# Patient Record
Sex: MALE | Race: BLACK OR AFRICAN AMERICAN | NOT HISPANIC OR LATINO | Employment: FULL TIME | URBAN - METROPOLITAN AREA
[De-identification: names, ages, dates, MRNs, and addresses within clinical notes are randomized per-mention and may not be internally consistent; named-entity substitution may affect disease eponyms.]

---

## 2021-12-06 ENCOUNTER — TELEPHONE (OUTPATIENT)
Dept: FAMILY MEDICINE CLINIC | Facility: CLINIC | Age: 26
End: 2021-12-06

## 2021-12-08 ENCOUNTER — OFFICE VISIT (OUTPATIENT)
Dept: FAMILY MEDICINE CLINIC | Facility: CLINIC | Age: 26
End: 2021-12-08

## 2021-12-08 ENCOUNTER — APPOINTMENT (OUTPATIENT)
Dept: LAB | Facility: CLINIC | Age: 26
End: 2021-12-08

## 2021-12-08 VITALS
SYSTOLIC BLOOD PRESSURE: 138 MMHG | BODY MASS INDEX: 30.95 KG/M2 | HEART RATE: 81 BPM | WEIGHT: 192.6 LBS | TEMPERATURE: 98.7 F | DIASTOLIC BLOOD PRESSURE: 74 MMHG | HEIGHT: 66 IN | OXYGEN SATURATION: 100 %

## 2021-12-08 DIAGNOSIS — Z11.4 SCREENING FOR HIV (HUMAN IMMUNODEFICIENCY VIRUS): ICD-10-CM

## 2021-12-08 DIAGNOSIS — Z11.59 ENCOUNTER FOR HEPATITIS C SCREENING TEST FOR LOW RISK PATIENT: ICD-10-CM

## 2021-12-08 DIAGNOSIS — Z02.89 ENCOUNTER FOR PHYSICAL EXAMINATION RELATED TO EMPLOYMENT: Primary | ICD-10-CM

## 2021-12-08 DIAGNOSIS — Z02.89 ENCOUNTER FOR PHYSICAL EXAMINATION RELATED TO EMPLOYMENT: ICD-10-CM

## 2021-12-08 DIAGNOSIS — R03.0 ELEVATED BLOOD PRESSURE READING IN OFFICE WITH WHITE COAT SYNDROME, WITHOUT DIAGNOSIS OF HYPERTENSION: ICD-10-CM

## 2021-12-08 LAB
ALBUMIN SERPL BCP-MCNC: 4.3 G/DL (ref 3.5–5)
ALP SERPL-CCNC: 147 U/L (ref 46–116)
ALT SERPL W P-5'-P-CCNC: 100 U/L (ref 12–78)
ANION GAP SERPL CALCULATED.3IONS-SCNC: 4 MMOL/L (ref 4–13)
AST SERPL W P-5'-P-CCNC: 35 U/L (ref 5–45)
BASOPHILS # BLD AUTO: 0.04 THOUSANDS/ΜL (ref 0–0.1)
BASOPHILS NFR BLD AUTO: 1 % (ref 0–1)
BILIRUB SERPL-MCNC: 0.99 MG/DL (ref 0.2–1)
BUN SERPL-MCNC: 14 MG/DL (ref 5–25)
CALCIUM SERPL-MCNC: 9 MG/DL (ref 8.3–10.1)
CHLORIDE SERPL-SCNC: 106 MMOL/L (ref 100–108)
CO2 SERPL-SCNC: 28 MMOL/L (ref 21–32)
CREAT SERPL-MCNC: 0.96 MG/DL (ref 0.6–1.3)
EOSINOPHIL # BLD AUTO: 0.09 THOUSAND/ΜL (ref 0–0.61)
EOSINOPHIL NFR BLD AUTO: 2 % (ref 0–6)
ERYTHROCYTE [DISTWIDTH] IN BLOOD BY AUTOMATED COUNT: 13 % (ref 11.6–15.1)
GFR SERPL CREATININE-BSD FRML MDRD: 127 ML/MIN/1.73SQ M
GLUCOSE P FAST SERPL-MCNC: 73 MG/DL (ref 65–99)
HCT VFR BLD AUTO: 45.1 % (ref 36.5–49.3)
HCV AB SER QL: NORMAL
HGB BLD-MCNC: 15.2 G/DL (ref 12–17)
IMM GRANULOCYTES # BLD AUTO: 0.01 THOUSAND/UL (ref 0–0.2)
IMM GRANULOCYTES NFR BLD AUTO: 0 % (ref 0–2)
LYMPHOCYTES # BLD AUTO: 1.9 THOUSANDS/ΜL (ref 0.6–4.47)
LYMPHOCYTES NFR BLD AUTO: 34 % (ref 14–44)
MCH RBC QN AUTO: 29.7 PG (ref 26.8–34.3)
MCHC RBC AUTO-ENTMCNC: 33.7 G/DL (ref 31.4–37.4)
MCV RBC AUTO: 88 FL (ref 82–98)
MONOCYTES # BLD AUTO: 0.36 THOUSAND/ΜL (ref 0.17–1.22)
MONOCYTES NFR BLD AUTO: 7 % (ref 4–12)
NEUTROPHILS # BLD AUTO: 3.14 THOUSANDS/ΜL (ref 1.85–7.62)
NEUTS SEG NFR BLD AUTO: 56 % (ref 43–75)
NRBC BLD AUTO-RTO: 0 /100 WBCS
PLATELET # BLD AUTO: 332 THOUSANDS/UL (ref 149–390)
PMV BLD AUTO: 10.6 FL (ref 8.9–12.7)
POTASSIUM SERPL-SCNC: 4.1 MMOL/L (ref 3.5–5.3)
PROT SERPL-MCNC: 7.6 G/DL (ref 6.4–8.2)
RBC # BLD AUTO: 5.11 MILLION/UL (ref 3.88–5.62)
SODIUM SERPL-SCNC: 138 MMOL/L (ref 136–145)
TSH SERPL DL<=0.05 MIU/L-ACNC: 1.55 UIU/ML (ref 0.36–3.74)
WBC # BLD AUTO: 5.54 THOUSAND/UL (ref 4.31–10.16)

## 2021-12-08 PROCEDURE — 84443 ASSAY THYROID STIM HORMONE: CPT

## 2021-12-08 PROCEDURE — 80053 COMPREHEN METABOLIC PANEL: CPT

## 2021-12-08 PROCEDURE — 86803 HEPATITIS C AB TEST: CPT

## 2021-12-08 PROCEDURE — 99385 PREV VISIT NEW AGE 18-39: CPT | Performed by: FAMILY MEDICINE

## 2021-12-08 PROCEDURE — 85025 COMPLETE CBC W/AUTO DIFF WBC: CPT

## 2021-12-08 PROCEDURE — 36415 COLL VENOUS BLD VENIPUNCTURE: CPT

## 2021-12-08 PROCEDURE — 87389 HIV-1 AG W/HIV-1&-2 AB AG IA: CPT

## 2021-12-08 RX ORDER — HYDROCHLOROTHIAZIDE 12.5 MG/1
TABLET ORAL
COMMUNITY
Start: 2021-11-30 | End: 2022-04-13

## 2021-12-08 RX ORDER — HYDROCHLOROTHIAZIDE 12.5 MG/1
12.5 TABLET ORAL DAILY
Qty: 90 TABLET | Refills: 1 | Status: CANCELLED | OUTPATIENT
Start: 2021-12-08

## 2021-12-10 LAB — HIV 1+2 AB+HIV1 P24 AG SERPL QL IA: NORMAL

## 2021-12-15 DIAGNOSIS — R74.01 TRANSAMINITIS: Primary | ICD-10-CM

## 2022-04-13 ENCOUNTER — APPOINTMENT (OUTPATIENT)
Dept: LAB | Facility: CLINIC | Age: 27
End: 2022-04-13

## 2022-04-13 ENCOUNTER — OFFICE VISIT (OUTPATIENT)
Dept: FAMILY MEDICINE CLINIC | Facility: CLINIC | Age: 27
End: 2022-04-13

## 2022-04-13 VITALS
OXYGEN SATURATION: 99 % | WEIGHT: 184 LBS | TEMPERATURE: 98.7 F | BODY MASS INDEX: 29.57 KG/M2 | DIASTOLIC BLOOD PRESSURE: 100 MMHG | SYSTOLIC BLOOD PRESSURE: 156 MMHG | HEART RATE: 74 BPM | HEIGHT: 66 IN

## 2022-04-13 DIAGNOSIS — R74.01 TRANSAMINITIS: ICD-10-CM

## 2022-04-13 DIAGNOSIS — I10 HYPERTENSION, UNSPECIFIED TYPE: Primary | ICD-10-CM

## 2022-04-13 PROBLEM — R03.0 ELEVATED BLOOD PRESSURE READING IN OFFICE WITH WHITE COAT SYNDROME, WITHOUT DIAGNOSIS OF HYPERTENSION: Status: RESOLVED | Noted: 2021-12-08 | Resolved: 2022-04-13

## 2022-04-13 LAB
ALBUMIN SERPL BCP-MCNC: 4.3 G/DL (ref 3.5–5)
ALP SERPL-CCNC: 120 U/L (ref 46–116)
ALT SERPL W P-5'-P-CCNC: 37 U/L (ref 12–78)
ANION GAP SERPL CALCULATED.3IONS-SCNC: 3 MMOL/L (ref 4–13)
AST SERPL W P-5'-P-CCNC: 22 U/L (ref 5–45)
BILIRUB SERPL-MCNC: 0.34 MG/DL (ref 0.2–1)
BUN SERPL-MCNC: 14 MG/DL (ref 5–25)
CALCIUM SERPL-MCNC: 9.2 MG/DL (ref 8.3–10.1)
CHLORIDE SERPL-SCNC: 109 MMOL/L (ref 100–108)
CO2 SERPL-SCNC: 27 MMOL/L (ref 21–32)
CREAT SERPL-MCNC: 0.8 MG/DL (ref 0.6–1.3)
GFR SERPL CREATININE-BSD FRML MDRD: 125 ML/MIN/1.73SQ M
GLUCOSE P FAST SERPL-MCNC: 93 MG/DL (ref 65–99)
POTASSIUM SERPL-SCNC: 3.8 MMOL/L (ref 3.5–5.3)
PROT SERPL-MCNC: 7.4 G/DL (ref 6.4–8.2)
SODIUM SERPL-SCNC: 139 MMOL/L (ref 136–145)

## 2022-04-13 PROCEDURE — 80053 COMPREHEN METABOLIC PANEL: CPT

## 2022-04-13 PROCEDURE — 36415 COLL VENOUS BLD VENIPUNCTURE: CPT

## 2022-04-13 PROCEDURE — 99214 OFFICE O/P EST MOD 30 MIN: CPT | Performed by: FAMILY MEDICINE

## 2022-04-13 RX ORDER — AMLODIPINE BESYLATE 5 MG/1
5 TABLET ORAL DAILY
Qty: 90 TABLET | Refills: 1 | Status: SHIPPED | OUTPATIENT
Start: 2022-04-13

## 2022-04-13 NOTE — ASSESSMENT & PLAN NOTE
Patient with history of HTN  He is currently not taking medication  We discussed the importance of blood pressure control and risks of elevated HTN including stroke and MI  He is agreeable to starting medication  Stop HCTZ as it has not been effective  Start amlodipine 5 mg daily  Patient was provided with information for [employer] to obtain cheaper medication  He also was instructed to purchase blood pressure cuff online  Check BP daily and follow up in the office if readings are elevated  He verbalized understanding

## 2022-04-13 NOTE — ASSESSMENT & PLAN NOTE
Patient's previous blood work with elevated ALT and alk phos  We discussed possible causes such as gallstones, alcohol and tylenol intake  Repeat blood work was ordered but patient never completed  He will get labs completed today to recheck CMP

## 2022-04-13 NOTE — PROGRESS NOTES
Assessment/Plan:    1  Hypertension, unspecified type  Assessment & Plan:  Patient with history of HTN  He is currently not taking medication  We discussed the importance of blood pressure control and risks of elevated HTN including stroke and MI  He is agreeable to starting medication  Stop HCTZ as it has not been effective  Start amlodipine 5 mg daily  Patient was provided with information for [employer] to obtain cheaper medication  He also was instructed to purchase blood pressure cuff online  Check BP daily and follow up in the office if readings are elevated  He verbalized understanding  Orders:  -     amLODIPine (NORVASC) 5 mg tablet; Take 1 tablet (5 mg total) by mouth daily    2  Transaminitis  Assessment & Plan:  Patient's previous blood work with elevated ALT and alk phos  We discussed possible causes such as gallstones, alcohol and tylenol intake  Repeat blood work was ordered but patient never completed  He will get labs completed today to recheck CMP  Patient is medically cleared to participate in fitness test      Subjective:      Patient ID: Reagan Gleason is a 27 y o  male  HPI    Patient with PMhx of HTN presenting for medical clearance to participate in fitness test for [employer]  He participated in the previous fitness test and passed  He exercises regularly and has been losing weight and eating a healthier diet  His blood pressure is still elevated and admits that he does not take HCTZ anymore  His previous blood work was normal except for mild elevation in ALT and alk phos  Patient denies frequent alcohol use  He does not check his BP at home  Denies associated symptoms such as chest pain or headaches  He exercises regularly and denies any symptoms while exercising, such as chest pain, SOB and palpitations  Patient denies history of CAD in the family        The following portions of the patient's history were reviewed and updated as appropriate: allergies, current medications, past family history, past medical history, past social history, past surgical history, and problem list       Current Outpatient Medications:     amLODIPine (NORVASC) 5 mg tablet, Take 1 tablet (5 mg total) by mouth daily, Disp: 90 tablet, Rfl: 1      Review of Systems   Constitutional: Negative for appetite change, chills, fatigue and fever  HENT: Negative for congestion, ear discharge, ear pain, postnasal drip, rhinorrhea, sinus pressure, sinus pain, sneezing, sore throat and trouble swallowing  Eyes: Negative for pain, discharge, redness, itching and visual disturbance  Respiratory: Negative for apnea, cough, chest tightness, shortness of breath and wheezing  Cardiovascular: Negative for chest pain, palpitations and leg swelling  Gastrointestinal: Negative for abdominal distention, abdominal pain, blood in stool, constipation, diarrhea, nausea and vomiting  Endocrine: Negative for polyuria  Genitourinary: Negative for decreased urine volume, difficulty urinating, dysuria, flank pain, frequency, hematuria, penile discharge, penile pain, penile swelling, scrotal swelling, testicular pain and urgency  Musculoskeletal: Negative for arthralgias, back pain, gait problem, joint swelling, myalgias, neck pain and neck stiffness  Skin: Negative for color change and rash  Neurological: Negative for dizziness, seizures, syncope, weakness, light-headedness, numbness and headaches  Psychiatric/Behavioral: Negative for behavioral problems  The patient is not nervous/anxious  All other systems reviewed and are negative  Objective:      /100 (BP Location: Left arm, Patient Position: Sitting, Cuff Size: Large)   Pulse 74   Temp 98 7 °F (37 1 °C) (Tympanic)   Ht 5' 6 25" (1 683 m)   Wt 83 5 kg (184 lb)   SpO2 99%   BMI 29 47 kg/m²          Physical Exam  Vitals and nursing note reviewed     Constitutional:       General: He is not in acute distress  Appearance: Normal appearance  He is not toxic-appearing  HENT:      Head: Normocephalic and atraumatic  Right Ear: External ear normal  There is no impacted cerumen  Left Ear: External ear normal  There is no impacted cerumen  Nose: Nose normal       Mouth/Throat:      Mouth: Mucous membranes are moist       Pharynx: Oropharynx is clear  No oropharyngeal exudate  Eyes:      Extraocular Movements: Extraocular movements intact  Conjunctiva/sclera: Conjunctivae normal       Pupils: Pupils are equal, round, and reactive to light  Neck:      Thyroid: No thyromegaly  Vascular: No carotid bruit  Cardiovascular:      Rate and Rhythm: Normal rate and regular rhythm  Heart sounds: Normal heart sounds  No murmur heard  Pulmonary:      Effort: Pulmonary effort is normal  No accessory muscle usage or respiratory distress  Breath sounds: Normal breath sounds and air entry  Abdominal:      General: Bowel sounds are normal  There is no distension  Palpations: Abdomen is soft  There is no mass  Tenderness: There is no abdominal tenderness  Musculoskeletal:         General: Normal range of motion  Cervical back: Full passive range of motion without pain, normal range of motion and neck supple  Right lower leg: No edema  Left lower leg: No edema  Skin:     General: Skin is warm and dry  Neurological:      General: No focal deficit present  Mental Status: He is alert and oriented to person, place, and time  Mental status is at baseline  Cranial Nerves: No cranial nerve deficit  Psychiatric:         Mood and Affect: Mood normal          Behavior: Behavior normal          Thought Content:  Thought content normal

## 2022-05-03 ENCOUNTER — OFFICE VISIT (OUTPATIENT)
Dept: FAMILY MEDICINE CLINIC | Facility: CLINIC | Age: 27
End: 2022-05-03

## 2022-05-03 VITALS
HEART RATE: 95 BPM | BODY MASS INDEX: 29.32 KG/M2 | SYSTOLIC BLOOD PRESSURE: 144 MMHG | WEIGHT: 182.4 LBS | OXYGEN SATURATION: 97 % | HEIGHT: 66 IN | DIASTOLIC BLOOD PRESSURE: 72 MMHG | TEMPERATURE: 98.1 F

## 2022-05-03 DIAGNOSIS — I10 HYPERTENSION, UNSPECIFIED TYPE: Primary | ICD-10-CM

## 2022-05-03 PROCEDURE — 99212 OFFICE O/P EST SF 10 MIN: CPT | Performed by: FAMILY MEDICINE

## 2022-05-03 NOTE — ASSESSMENT & PLAN NOTE
Blood pressure has improved with amlodipine 5 mg daily  Continue current medication  Patient was instructed to obtain blood pressure cuff and start checking blood pressure regularly  Follow up if blood pressure increases for medication adjustment  Schedule 6 month follow up  Patient's work physical fitness form was completed today

## 2022-05-03 NOTE — PROGRESS NOTES
Assessment/Plan:    1  Hypertension, unspecified type  Assessment & Plan:  Blood pressure has improved with amlodipine 5 mg daily  Continue current medication  Patient was instructed to obtain blood pressure cuff and start checking blood pressure regularly  Follow up if blood pressure increases for medication adjustment  Schedule 6 month follow up  Patient's work physical fitness form was completed today  Subjective:      Patient ID: Geoffrey Fagan is a 27 y o  male  HPI    Patient presenting for follow up of his blood pressure  He has another fitness form to be completed for his job  He completed the last fitness test but has another one scheduled for tomorrow  Patient feels well overall  He started norvasc with no side effects  He denies headache or chest pain  He continues to exercise and eat healthy  The following portions of the patient's history were reviewed and updated as appropriate: allergies, current medications, past family history, past medical history, past social history, past surgical history, and problem list       Current Outpatient Medications:     amLODIPine (NORVASC) 5 mg tablet, Take 1 tablet (5 mg total) by mouth daily, Disp: 90 tablet, Rfl: 1      Review of Systems   Constitutional: Negative for chills and fever  HENT: Negative for ear pain and sore throat  Eyes: Negative for pain and visual disturbance  Respiratory: Negative for cough and shortness of breath  Cardiovascular: Negative for chest pain and palpitations  Gastrointestinal: Negative for abdominal pain and vomiting  Genitourinary: Negative for dysuria and hematuria  Musculoskeletal: Negative for arthralgias and back pain  Skin: Negative for color change and rash  Neurological: Negative for seizures and syncope  All other systems reviewed and are negative          Objective:      /72 (BP Location: Left arm, Patient Position: Sitting, Cuff Size: Large)   Pulse 95   Temp 98 1 °F (36 7 °C) (Tympanic)   Ht 5' 6 25" (1 683 m)   Wt 82 7 kg (182 lb 6 4 oz)   SpO2 97%   BMI 29 22 kg/m²          Physical Exam  Vitals and nursing note reviewed  Constitutional:       General: He is not in acute distress  Appearance: Normal appearance  He is not toxic-appearing  HENT:      Head: Normocephalic and atraumatic  Eyes:      Extraocular Movements: Extraocular movements intact  Conjunctiva/sclera: Conjunctivae normal       Pupils: Pupils are equal, round, and reactive to light  Neck:      Vascular: No carotid bruit  Cardiovascular:      Rate and Rhythm: Normal rate and regular rhythm  Heart sounds: Normal heart sounds  No murmur heard  Pulmonary:      Effort: Pulmonary effort is normal  No respiratory distress  Breath sounds: Normal breath sounds  No wheezing  Musculoskeletal:      Cervical back: Normal range of motion  Neurological:      General: No focal deficit present  Mental Status: He is alert and oriented to person, place, and time  Mental status is at baseline  Psychiatric:         Mood and Affect: Mood normal          Behavior: Behavior normal          Thought Content:  Thought content normal          Judgment: Judgment normal

## 2022-06-20 ENCOUNTER — TELEPHONE (OUTPATIENT)
Dept: FAMILY MEDICINE CLINIC | Facility: CLINIC | Age: 27
End: 2022-06-20

## 2022-06-20 NOTE — TELEPHONE ENCOUNTER
Patient called requesting a stationary letter from Dr Bobby Marrero stating that his blood pressure is under control (140/100) and he is able to do the job of a [employer] without any restrictions  He is requesting a call back when the letter is able to be picked up

## 2022-06-21 NOTE — TELEPHONE ENCOUNTER
Called and informed patient of completed letter  Patient was thankful and states he'll pick the letter up today or tomorrow  Placed letter in folder by check-in

## 2022-10-31 ENCOUNTER — OFFICE VISIT (OUTPATIENT)
Dept: CARDIOLOGY CLINIC | Facility: CLINIC | Age: 27
End: 2022-10-31

## 2022-10-31 VITALS
BODY MASS INDEX: 29.31 KG/M2 | SYSTOLIC BLOOD PRESSURE: 130 MMHG | WEIGHT: 183 LBS | DIASTOLIC BLOOD PRESSURE: 90 MMHG | HEART RATE: 63 BPM

## 2022-10-31 DIAGNOSIS — Z01.810 PREOP CARDIOVASCULAR EXAM: ICD-10-CM

## 2022-10-31 DIAGNOSIS — I10 PRIMARY HYPERTENSION: Primary | ICD-10-CM

## 2022-10-31 NOTE — PROGRESS NOTES
Cardiology Follow Up    Ed Holding  1995  [id number]  [address] [facility] [address] [place] [id number] [phone]    1  Primary hypertension  POCT ECG       Interval History:  Cardiology consultation  32-year-old male who has history hypertension, diagnosed at age 23, no history of secondary hypertension workup  Previously on low-dose diuretic with poor control, currently on medium dose calcium channel blocker  Previous blood work revealed normal creatinine normal potassium and sodium levels  Mildly increased alkaline phosphatase and normal transaminases  Patient is clinically doing well, is active, he is actually exercising regularly, he lost close to 40 lb, his BMI today is 29  No history obstructive sleep apnea, no history of kidney disease or liver disease  Currently having no symptoms  Patient is applying for police academy at [employer]   Because of his hypertension his require to have a stress test and echocardiogram       Patient Active Problem List   Diagnosis   • Hypertension   • Transaminitis     Past Medical History:   Diagnosis Date   • Hypertension      Social History     Socioeconomic History   • Marital status: Single     Spouse name: Not on file   • Number of children: Not on file   • Years of education: Not on file   • Highest education level: Not on file   Occupational History   • Not on file   Tobacco Use   • Smoking status: Never Smoker   • Smokeless tobacco: Never Used   Vaping Use   • Vaping Use: Some days   • Substances: Flavoring   Substance and Sexual Activity   • Alcohol use:  Yes   • Drug use: Not Currently   • Sexual activity: Not on file   Other Topics Concern   • Not on file   Social History Narrative   • Not on file     Social Determinants of Health     Financial Resource Strain: Not on file   Food Insecurity: Not on file   Transportation Needs: Not on file   Physical Activity: Not on file   Stress: Not on file   Social Connections: Not on file   Intimate Partner Violence: Not on file   Housing Stability: Not on file      Family History   Problem Relation Age of Onset   • No Known Problems Mother    • Seizures Father    • Hyperlipidemia Father    • Hypertension Father      No past surgical history on file  Current Outpatient Medications:   •  amLODIPine (NORVASC) 5 mg tablet, Take 1 tablet (5 mg total) by mouth daily, Disp: 90 tablet, Rfl: 1  No Known Allergies    Labs:  No visits with results within 6 Month(s) from this visit  Latest known visit with results is:   Appointment on 04/13/2022   Component Date Value   • Sodium 04/13/2022 139    • Potassium 04/13/2022 3 8    • Chloride 04/13/2022 109 (A)   • CO2 04/13/2022 27    • ANION GAP 04/13/2022 3 (A)   • BUN 04/13/2022 14    • Creatinine 04/13/2022 0 80    • Glucose, Fasting 04/13/2022 93    • Calcium 04/13/2022 9 2    • AST 04/13/2022 22    • ALT 04/13/2022 37    • Alkaline Phosphatase 04/13/2022 120 (A)   • Total Protein 04/13/2022 7 4    • Albumin 04/13/2022 4 3    • Total Bilirubin 04/13/2022 0 34    • eGFR 04/13/2022 125      Imaging: No results found  Review of Systems:  Review of Systems   Constitutional: Negative for activity change and fatigue  Eyes: Negative for visual disturbance  Respiratory: Negative for apnea, shortness of breath, wheezing and stridor  Cardiovascular: Negative for chest pain, palpitations and leg swelling  Endocrine: Negative for cold intolerance and heat intolerance  Musculoskeletal: Negative for arthralgias, gait problem and myalgias  Allergic/Immunologic: Negative for immunocompromised state  Neurological: Negative for dizziness and syncope  Hematological: Does not bruise/bleed easily  Psychiatric/Behavioral: Negative for sleep disturbance  The patient is not nervous/anxious  Physical Exam:  Physical Exam  Vitals reviewed     Constitutional:       General: He is not in acute distress  Appearance: Normal appearance  He is not ill-appearing, toxic-appearing or diaphoretic  Neck:      Vascular: No carotid bruit  Cardiovascular:      Rate and Rhythm: Normal rate and regular rhythm  Pulses: Normal pulses  Heart sounds: Normal heart sounds  No murmur heard  No friction rub  No gallop  Pulmonary:      Effort: Pulmonary effort is normal  No respiratory distress  Breath sounds: Normal breath sounds  No stridor  No wheezing, rhonchi or rales  Musculoskeletal:      Right lower leg: No edema  Left lower leg: No edema  Skin:     General: Skin is warm and dry  Capillary Refill: Capillary refill takes less than 2 seconds  Coloration: Skin is not jaundiced  Findings: No bruising or erythema  Neurological:      General: No focal deficit present  Mental Status: He is alert and oriented to person, place, and time  Psychiatric:         Mood and Affect: Mood normal          Discussion/Summary:  Hypertension, likely essential, chronic be controlled, his EKG is unremarkable  My preference will be to proceed with the echocardiogram to assess LV function and thickness  Cheyenne Molina no workup for secondary hypertension as his well-controlled on 1 drug  Long-term benefits of blood pressure control including reduction of CVA, myocardial infarction renal dysfunction and vascular disease were explained to the patient  I stressed importance of low-sodium diet specifically 2 g/24 hour sodium chloride restriction  As well as regular exercise and weight management  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  Clearance given, he required stress test and echocardiogram, will order those test     This note was completed in part utilizing [employer] direct voice recognition software     Grammatical errors, random word insertion, spelling mistakes, and incomplete sentences may be an occasional consequence of the system secondary to software limitations, ambient noise and hardware issues  At the time of dictation, efforts were made to edit, clarify and /or correct errors  Please read the chart carefully and recognize, using context, where substitutions have occurred  If you have any questions or concerns about the context, text or information contained within the body of this dictation, please contact myself, the provider, for further clarification

## 2022-11-21 ENCOUNTER — HOSPITAL ENCOUNTER (OUTPATIENT)
Dept: NON INVASIVE DIAGNOSTICS | Facility: CLINIC | Age: 27
Discharge: HOME | End: 2022-11-21

## 2022-11-21 VITALS
BODY MASS INDEX: 29.41 KG/M2 | SYSTOLIC BLOOD PRESSURE: 130 MMHG | WEIGHT: 183 LBS | HEART RATE: 66 BPM | HEIGHT: 66 IN | DIASTOLIC BLOOD PRESSURE: 90 MMHG

## 2022-11-21 VITALS
OXYGEN SATURATION: 100 % | HEIGHT: 66 IN | HEART RATE: 79 BPM | SYSTOLIC BLOOD PRESSURE: 146 MMHG | DIASTOLIC BLOOD PRESSURE: 88 MMHG | BODY MASS INDEX: 29.41 KG/M2 | WEIGHT: 183 LBS

## 2022-11-21 DIAGNOSIS — Z01.810 PREOP CARDIOVASCULAR EXAM: ICD-10-CM

## 2022-11-21 DIAGNOSIS — I10 PRIMARY HYPERTENSION: ICD-10-CM

## 2022-11-21 LAB
AORTIC ROOT: 2.8 CM
APICAL FOUR CHAMBER EJECTION FRACTION: 62 %
ASCENDING AORTA: 2.8 CM
BASELINE ST DEPRESSION: 0 MM
E WAVE DECELERATION TIME: 197 MS
FRACTIONAL SHORTENING: 33 (ref 28–44)
INTERVENTRICULAR SEPTUM IN DIASTOLE (PARASTERNAL SHORT AXIS VIEW): 1 CM
INTERVENTRICULAR SEPTUM: 1 CM (ref 0.6–1.1)
LAAS-AP2: 23.5 CM2
LAAS-AP4: 25.9 CM2
LEFT ATRIUM SIZE: 4.3 CM
LEFT INTERNAL DIMENSION IN SYSTOLE: 3.2 CM (ref 2.1–4)
LEFT VENTRICULAR INTERNAL DIMENSION IN DIASTOLE: 4.8 CM (ref 3.5–6)
LEFT VENTRICULAR POSTERIOR WALL IN END DIASTOLE: 1 CM
LEFT VENTRICULAR STROKE VOLUME: 68 ML
LVSV (TEICH): 68 ML
MAX HR PERCENT: 92 %
MAX HR: 181 BPM
MV E'TISSUE VEL-SEP: 11 CM/S
MV PEAK A VEL: 0.5 M/S
MV PEAK E VEL: 81 CM/S
MV STENOSIS PRESSURE HALF TIME: 57 MS
MV VALVE AREA P 1/2 METHOD: 3.86
RA PRESSURE ESTIMATED: 5 MMHG
RATE PRESSURE PRODUCT: NORMAL
RIGHT ATRIUM AREA SYSTOLE A4C: 18.1 CM2
RIGHT VENTRICLE ID DIMENSION: 4.4 CM
RV PSP: 29 MMHG
SL CV LEFT ATRIUM LENGTH A2C: 6 CM
SL CV LV EF: 60
SL CV PED ECHO LEFT VENTRICLE DIASTOLIC VOLUME (MOD BIPLANE) 2D: 108 ML
SL CV PED ECHO LEFT VENTRICLE SYSTOLIC VOLUME (MOD BIPLANE) 2D: 40 ML
SL CV STRESS RECOVERY BP: NORMAL MMHG
SL CV STRESS RECOVERY HR: 117 BPM
SL CV STRESS RECOVERY O2 SAT: 96 %
SL CV STRESS STAGE REACHED: 5
STRESS ANGINA INDEX: 0
STRESS BASELINE BP: NORMAL MMHG
STRESS BASELINE HR: 79 BPM
STRESS DUKE TREADMILL SCORE: 8
STRESS O2 SAT REST: 100 %
STRESS PEAK HR: 181 BPM
STRESS POST ESTIMATED WORKLOAD: 15.3 METS
STRESS POST EXERCISE DUR MIN: 12 MIN
STRESS POST EXERCISE DUR SEC: 30 SEC
STRESS POST O2 SAT PEAK: 97 %
STRESS POST PEAK BP: 180 MMHG
STRESS ST ELEVATION: 1 MM
TR MAX PG: 24 MMHG
TR PEAK VELOCITY: 2.5 M/S
TRICUSPID VALVE PEAK REGURGITATION VELOCITY: 2.45 M/S

## 2022-11-22 ENCOUNTER — TELEPHONE (OUTPATIENT)
Dept: CARDIOLOGY CLINIC | Facility: CLINIC | Age: 27
End: 2022-11-22

## 2022-11-22 LAB
CHEST PAIN STATEMENT: NORMAL
MAX DIASTOLIC BP: 86 MMHG
MAX HEART RATE: 181 BPM
MAX PREDICTED HEART RATE: 195 BPM
MAX. SYSTOLIC BP: 180 MMHG
PROTOCOL NAME: NORMAL
REASON FOR TERMINATION: NORMAL
TARGET HR FORMULA: NORMAL
TEST INDICATION: NORMAL
TIME IN EXERCISE PHASE: NORMAL

## 2022-11-22 NOTE — TELEPHONE ENCOUNTER
Pt called and stated, he received your Stress and Echo results in his MyChart  Pt asked if you would be able to write his cardiac clearance now  In the clearance he stated it needs to be said He can physically perform his duties as a  with NO restrictions with a BP reading of 130/90  Please advise       Thank you

## 2022-12-06 ENCOUNTER — TELEPHONE (OUTPATIENT)
Dept: CARDIOLOGY CLINIC | Facility: CLINIC | Age: 27
End: 2022-12-06

## 2022-12-06 NOTE — TELEPHONE ENCOUNTER
Spoke with pt and faxed completed form to [address] [place] [address]  # [phone]  Also sent to [facility] to be placed in Chart

## 2022-12-07 DIAGNOSIS — I10 HYPERTENSION, UNSPECIFIED TYPE: ICD-10-CM

## 2022-12-07 RX ORDER — AMLODIPINE BESYLATE 5 MG/1
5 TABLET ORAL DAILY
Qty: 90 TABLET | Refills: 1 | Status: SHIPPED | OUTPATIENT
Start: 2022-12-07

## 2023-01-26 ENCOUNTER — IMMUNIZATIONS (OUTPATIENT)
Dept: FAMILY MEDICINE CLINIC | Facility: CLINIC | Age: 28
End: 2023-01-26

## 2023-01-26 DIAGNOSIS — Z23 NEED FOR COVID-19 VACCINE: Primary | ICD-10-CM

## 2023-08-15 DIAGNOSIS — I10 HYPERTENSION, UNSPECIFIED TYPE: ICD-10-CM

## 2023-08-16 RX ORDER — AMLODIPINE BESYLATE 5 MG/1
5 TABLET ORAL DAILY
Qty: 90 TABLET | Refills: 0 | Status: SHIPPED | OUTPATIENT
Start: 2023-08-16

## 2023-11-30 ENCOUNTER — TELEPHONE (OUTPATIENT)
Dept: FAMILY MEDICINE CLINIC | Facility: CLINIC | Age: 28
End: 2023-11-30

## 2023-11-30 NOTE — TELEPHONE ENCOUNTER
Called pt to sched physical appt. He stated that he moved and is looking for a new PCP.  Please remove Dr. Kendy Iraheta as pcp